# Patient Record
Sex: FEMALE | Race: WHITE | Employment: FULL TIME | URBAN - METROPOLITAN AREA
[De-identification: names, ages, dates, MRNs, and addresses within clinical notes are randomized per-mention and may not be internally consistent; named-entity substitution may affect disease eponyms.]

---

## 2019-11-22 LAB — PAP SMEAR, EXTERNAL: NEGATIVE

## 2020-12-03 VITALS
HEIGHT: 65 IN | SYSTOLIC BLOOD PRESSURE: 110 MMHG | DIASTOLIC BLOOD PRESSURE: 68 MMHG | BODY MASS INDEX: 23.16 KG/M2 | WEIGHT: 139 LBS

## 2020-12-03 PROBLEM — J45.909 ASTHMA: Status: ACTIVE | Noted: 2020-12-03

## 2020-12-03 PROBLEM — F32.A DEPRESSIVE DISORDER: Status: ACTIVE | Noted: 2020-12-03

## 2020-12-03 PROBLEM — F41.9 ANXIETY: Status: ACTIVE | Noted: 2020-12-03

## 2020-12-03 RX ORDER — SERTRALINE HYDROCHLORIDE 50 MG/1
TABLET, FILM COATED ORAL
COMMUNITY
Start: 2020-11-07

## 2020-12-03 RX ORDER — MELOXICAM 15 MG/1
TABLET ORAL
COMMUNITY
Start: 2020-10-08

## 2020-12-03 RX ORDER — MONTELUKAST SODIUM 10 MG/1
TABLET ORAL
COMMUNITY
Start: 2020-11-07

## 2020-12-08 VITALS — BODY MASS INDEX: 23.13 KG/M2 | HEIGHT: 65 IN

## 2020-12-09 ENCOUNTER — OFFICE VISIT (OUTPATIENT)
Dept: OBGYN CLINIC | Age: 50
End: 2020-12-09
Payer: COMMERCIAL

## 2020-12-09 VITALS
DIASTOLIC BLOOD PRESSURE: 82 MMHG | BODY MASS INDEX: 27.36 KG/M2 | WEIGHT: 164.25 LBS | SYSTOLIC BLOOD PRESSURE: 138 MMHG | HEIGHT: 65 IN

## 2020-12-09 DIAGNOSIS — N94.89 SUPPRESSION OF MENSTRUATION: ICD-10-CM

## 2020-12-09 DIAGNOSIS — Z01.419 ROUTINE GYNECOLOGICAL EXAMINATION: ICD-10-CM

## 2020-12-09 DIAGNOSIS — Z12.4 SCREENING FOR MALIGNANT NEOPLASM OF CERVIX: Primary | ICD-10-CM

## 2020-12-09 DIAGNOSIS — Z12.31 VISIT FOR SCREENING MAMMOGRAM: Primary | ICD-10-CM

## 2020-12-09 DIAGNOSIS — N94.10 DYSPAREUNIA IN FEMALE: ICD-10-CM

## 2020-12-09 PROCEDURE — 77067 SCR MAMMO BI INCL CAD: CPT | Performed by: OBSTETRICS & GYNECOLOGY

## 2020-12-09 PROCEDURE — 99396 PREV VISIT EST AGE 40-64: CPT | Performed by: OBSTETRICS & GYNECOLOGY

## 2020-12-09 RX ORDER — ESTRADIOL 10 UG/1
10 INSERT VAGINAL
Qty: 8 TAB | Refills: 11 | Status: SHIPPED | OUTPATIENT
Start: 2020-12-10 | End: 2022-01-12 | Stop reason: SDUPTHER

## 2020-12-09 RX ORDER — NORETHINDRONE ACETATE AND ETHINYL ESTRADIOL 1; .02 MG/1; MG/1
1 TABLET ORAL DAILY
Qty: 3 PACKAGE | Refills: 3 | Status: SHIPPED | OUTPATIENT
Start: 2020-12-09 | End: 2021-11-08 | Stop reason: SDUPTHER

## 2020-12-09 NOTE — PROGRESS NOTES
Eyad Pham is a , 52 y.o. female   Patient's last menstrual period was 2020. She presents for her annual    She is having no cycle for > 6 mths, having some dyspareunia. Menstrual status:  Her periods are: absent. Cycles are: very light to non existent due to contraceptive hormones. She does not have dysmenorrhea. Medical conditions:  Since her last annual GYN exam about one year ago, she has not the following changes in her health history: none. Past Medical History:   Diagnosis Date    Anxiety 12/3/2020    Asthma 12/3/2020    Depressive disorder 12/3/2020     Past Surgical History:   Procedure Laterality Date    HX  SECTION      HX CHOLECYSTECTOMY      HX TONSILLECTOMY      with removal of adenoids       Prior to Admission medications    Medication Sig Start Date End Date Taking? Authorizing Provider   Lex Salinas 619-19-31 mg-mg-million tab Take  by mouth. Yes Provider, Historical   meloxicam (MOBIC) 15 mg tablet take 1 tablet by mouth once daily 10/8/20  Yes Provider, Historical   montelukast (SINGULAIR) 10 mg tablet take 1 tablet by mouth once daily 20  Yes Provider, Historical   sertraline (ZOLOFT) 50 mg tablet take 1 tablet by mouth once daily 20  Yes Provider, Historical   Javier , , 1-20 mg-mcg tablet take 1 tablet by mouth once daily 11/3/20  Yes Marleen Valdes MD       No Known Allergies       Tobacco History:  reports that she has never smoked. She has never used smokeless tobacco.  Alcohol Abuse:  reports previous alcohol use. Drug Abuse:  reports no history of drug use. Family Medical/Cancer History:   Family History   Problem Relation Age of Onset    Diabetes Other     Heart Disease Other           Review of Systems   Constitutional: Negative for chills, fever, malaise/fatigue and weight loss. HENT: Negative for congestion, ear pain, sinus pain and tinnitus. Eyes: Negative for blurred vision and double vision. Respiratory: Negative for cough, shortness of breath and wheezing. Cardiovascular: Negative for chest pain and palpitations. Gastrointestinal: Negative for abdominal pain, blood in stool, constipation, diarrhea, heartburn, nausea and vomiting. Genitourinary: Negative for dysuria, flank pain, frequency, hematuria and urgency. Musculoskeletal: Negative for joint pain and myalgias. Skin: Negative for itching and rash. Neurological: Negative for dizziness, weakness and headaches. Psychiatric/Behavioral: Negative for depression, memory loss and suicidal ideas. The patient is not nervous/anxious and does not have insomnia. Physical Exam  Constitutional:       Appearance: Normal appearance. HENT:      Head: Normocephalic and atraumatic. Cardiovascular:      Rate and Rhythm: Normal rate. Heart sounds: Normal heart sounds. Pulmonary:      Effort: Pulmonary effort is normal.      Breath sounds: Normal breath sounds. Chest:      Breasts:         Right: Normal.         Left: Normal.   Abdominal:      General: Abdomen is flat. Palpations: Abdomen is soft. Genitourinary:     General: Normal vulva. Vagina: Normal.      Cervix: Normal.      Uterus: Normal.       Adnexa: Right adnexa normal and left adnexa normal.      Rectum: Normal.      Comments: PAP Obtained  Neurological:      Mental Status: She is alert. Psychiatric:         Mood and Affect: Mood normal.         Behavior: Behavior normal.         Thought Content: Thought content normal.          Visit Vitals  /82 (BP 1 Location: Right arm, BP Patient Position: Sitting)   Ht 5' 5\" (1.651 m)   Wt 164 lb 4 oz (74.5 kg)   LMP 01/01/2020 Comment: ncocp x 3mths then breaks   BMI 27.33 kg/m²         Assessment:  Diagnoses and all orders for this visit:    1. Screening for malignant neoplasm of cervix  -     PAP, LB, RFX [facility]([id number])    2. Routine gynecological examination  -     PAP, LB, RFX HPV [facility]([id number])    3. Suppression of menstruation    4. Dyspareunia in female    Other orders  -     estradioL (VAGIFEM) 10 mcg tab vaginal tablet; Insert 1 Tab into vagina every Monday and Thursday. -     norethindrone-ethinyl estradiol (Javier 1/20, 21,) 1-20 mg-mcg tablet; Take 1 Tab by mouth daily. Plan:Questions addressed  Counseled re: diet, exercise, healthy lifestyle  Return for Annual  Rec annual mammogram        Follow-up and Dispositions    · Return for 1 yr annual, 1 yr mammo.

## 2020-12-12 LAB
CYTOLOGIST CVX/VAG CYTO: NORMAL
CYTOLOGY CVX/VAG DOC CYTO: NORMAL
DX ICD CODE: NORMAL
LABCORP, 190119: NORMAL
Lab: NORMAL
Lab: NORMAL
OTHER STN SPEC: NORMAL
STAT OF ADQ CVX/VAG CYTO-IMP: NORMAL

## 2021-11-08 DIAGNOSIS — N94.89 SUPPRESSION OF MENSES: Primary | ICD-10-CM

## 2021-11-08 RX ORDER — NORETHINDRONE ACETATE AND ETHINYL ESTRADIOL 1; .02 MG/1; MG/1
1 TABLET ORAL DAILY
Qty: 3 DOSE PACK | Refills: 0 | Status: SHIPPED | OUTPATIENT
Start: 2021-11-08 | End: 2022-01-12 | Stop reason: SDUPTHER

## 2022-01-12 ENCOUNTER — OFFICE VISIT (OUTPATIENT)
Dept: OBGYN CLINIC | Age: 52
End: 2022-01-12
Payer: COMMERCIAL

## 2022-01-12 VITALS
WEIGHT: 166.5 LBS | SYSTOLIC BLOOD PRESSURE: 122 MMHG | DIASTOLIC BLOOD PRESSURE: 68 MMHG | HEIGHT: 65 IN | BODY MASS INDEX: 27.74 KG/M2

## 2022-01-12 DIAGNOSIS — Z01.419 ROUTINE GYNECOLOGICAL EXAMINATION: ICD-10-CM

## 2022-01-12 DIAGNOSIS — N94.89 SUPPRESSION OF MENSES: ICD-10-CM

## 2022-01-12 DIAGNOSIS — N94.89 SUPPRESSION OF MENSTRUATION: ICD-10-CM

## 2022-01-12 DIAGNOSIS — Z12.4 SCREENING FOR MALIGNANT NEOPLASM OF CERVIX: Primary | ICD-10-CM

## 2022-01-12 PROCEDURE — 99396 PREV VISIT EST AGE 40-64: CPT | Performed by: OBSTETRICS & GYNECOLOGY

## 2022-01-12 RX ORDER — ESTRADIOL 10 UG/1
10 INSERT VAGINAL
Qty: 8 TABLET | Refills: 11 | Status: SHIPPED | OUTPATIENT
Start: 2022-01-13

## 2022-01-12 RX ORDER — NORETHINDRONE ACETATE AND ETHINYL ESTRADIOL 1; .02 MG/1; MG/1
1 TABLET ORAL DAILY
Qty: 3 DOSE PACK | Refills: 3 | Status: SHIPPED | OUTPATIENT
Start: 2022-01-12

## 2022-01-12 NOTE — PROGRESS NOTES
Chief Complaint   Patient presents with    Annual Exam     Mammo done here today       Visit Vitals  /68 (BP 1 Location: Left upper arm, BP Patient Position: Sitting, BP Cuff Size: Small adult)   Ht 5' 5\" (1.651 m)   Wt 166 lb 8 oz (75.5 kg)   BMI 27.71 kg/m²

## 2022-01-12 NOTE — PROGRESS NOTES
Tim Carrizales is a , 54 y.o. female   No LMP recorded. (Menstrual status: Other (see Comment)). She presents for her annual    She is having no significant problems. Menstrual status:  Cycles are very light to nonexistent due to contraceptive hormones. Flow: absent. She does not have dysmenorrhea. Medical conditions:  Since her last annual GYN exam about one year ago, she has not the following changes in her health history: none. Mammogram History:    [facility] Results (most recent):  Results from Office Visit encounter on 20    [facility] MAMMO BI SCREENING INCL CAD       DEXA Results (most recent):  No results found for this or any previous visit. Past Medical History:   Diagnosis Date    Anxiety 12/3/2020    Asthma 12/3/2020    Depressive disorder 12/3/2020     Past Surgical History:   Procedure Laterality Date    HX  SECTION      HX CHOLECYSTECTOMY      HX GI  ~9 yrs ago    Colonoscopy    HX TONSILLECTOMY      with removal of adenoids       Prior to Admission medications    Medication Sig Start Date End Date Taking? Authorizing Provider   Ajay Angel 109-46-99 mg-mg-million tab Take  by mouth. Yes Provider, Historical   meloxicam (MOBIC) 15 mg tablet take 1 tablet by mouth once daily 10/8/20  Yes Provider, Historical   montelukast (SINGULAIR) 10 mg tablet take 1 tablet by mouth once daily 20  Yes Provider, Historical   sertraline (ZOLOFT) 50 mg tablet take 1 tablet by mouth once daily 20  Yes Provider, Historical   estradioL (VAGIFEM) 10 mcg tab vaginal tablet Insert 1 Tablet into vagina every Monday and Thursday. 22  Yes Army Jarrett MD   norethindrone-ethinyl estradiol Ned Lemus , ,) 1-20 mg-mcg tablet Take 1 Tablet by mouth daily. 22  Yes Army Jarrett MD       No Known Allergies       Tobacco History:  reports that she has never smoked.  She has never used smokeless tobacco.  Alcohol Abuse:  reports previous alcohol use.  Drug Abuse:  reports no history of drug use. Family Medical/Cancer History:   Family History   Problem Relation Age of Onset    Diabetes Other     Heart Disease Other           Review of Systems   Constitutional: Negative for chills, fever, malaise/fatigue and weight loss. HENT: Negative for congestion, ear pain, sinus pain and tinnitus. Eyes: Negative for blurred vision and double vision. Respiratory: Negative for cough, shortness of breath and wheezing. Cardiovascular: Negative for chest pain and palpitations. Gastrointestinal: Negative for abdominal pain, blood in stool, constipation, diarrhea, heartburn, nausea and vomiting. Genitourinary: Negative for dysuria, flank pain, frequency, hematuria and urgency. Musculoskeletal: Negative for joint pain and myalgias. Skin: Negative for itching and rash. Neurological: Negative for dizziness, weakness and headaches. Psychiatric/Behavioral: Negative for depression, memory loss and suicidal ideas. The patient is not nervous/anxious and does not have insomnia. Physical Exam  Constitutional:       Appearance: Normal appearance. HENT:      Head: Normocephalic and atraumatic. Cardiovascular:      Rate and Rhythm: Normal rate. Heart sounds: Normal heart sounds. Pulmonary:      Effort: Pulmonary effort is normal.      Breath sounds: Normal breath sounds. Chest:   Breasts:      Right: Normal.      Left: Normal.       Abdominal:      General: Abdomen is flat. Palpations: Abdomen is soft. Genitourinary:     General: Normal vulva. Vagina: Normal.      Cervix: Normal.      Uterus: Normal.       Adnexa: Right adnexa normal and left adnexa normal.      Rectum: Normal.      Comments: PAP Obtained  Neurological:      Mental Status: She is alert. Psychiatric:         Mood and Affect: Mood normal.         Behavior: Behavior normal.         Thought Content:  Thought content normal.          Visit Vitals  /68 (BP 1 Location: Left upper arm, BP Patient Position: Sitting, BP Cuff Size: Small adult)   Ht 5' 5\" (1.651 m)   Wt 166 lb 8 oz (75.5 kg)   BMI 27.71 kg/m²         Assessment:   Diagnoses and all orders for this visit:    1. Screening for malignant neoplasm of cervix  -     PAP IG, RFX APTIMA HPV [facility] ([id number])    2. Routine gynecological examination  -     PAP IG, RFX APTIMA HPV [facility] ([id number])    3. Suppression of menstruation    4. Suppression of menses  -     norethindrone-ethinyl estradiol (Javier 1/20, 21,) 1-20 mg-mcg tablet; Take 1 Tablet by mouth daily. Other orders  -     estradioL (VAGIFEM) 10 mcg tab vaginal tablet; Insert 1 Tablet into vagina every Monday and Thursday. Plan: Questions addressed  Counseled re: diet, exercise, healthy lifestyle  Return for Annual  Rec annual mammogram      Follow-up and Dispositions    · Return for 1 yr annual, 1 yr mammo.

## 2022-01-13 LAB
CYTOLOGIST CVX/VAG CYTO: NORMAL
CYTOLOGY CVX/VAG DOC CYTO: NORMAL
CYTOLOGY CVX/VAG DOC THIN PREP: NORMAL
DX ICD CODE: NORMAL
LABCORP, 190119: NORMAL
Lab: NORMAL
OTHER STN SPEC: NORMAL
STAT OF ADQ CVX/VAG CYTO-IMP: NORMAL

## 2022-03-19 PROBLEM — F41.9 ANXIETY: Status: ACTIVE | Noted: 2020-12-03

## 2022-03-20 PROBLEM — F32.A DEPRESSIVE DISORDER: Status: ACTIVE | Noted: 2020-12-03

## 2022-03-20 PROBLEM — J45.909 ASTHMA: Status: ACTIVE | Noted: 2020-12-03

## 2023-01-12 DIAGNOSIS — N94.89 SUPPRESSION OF MENSES: ICD-10-CM

## 2023-01-12 RX ORDER — NORETHINDRONE ACETATE/ETHINYL ESTRADIOL 1MG-20MCG
KIT ORAL
Qty: 21 TABLET | Refills: 0 | Status: SHIPPED | OUTPATIENT
Start: 2023-01-12

## 2023-01-25 ENCOUNTER — OFFICE VISIT (OUTPATIENT)
Dept: OBGYN CLINIC | Age: 53
End: 2023-01-25
Payer: COMMERCIAL

## 2023-01-25 VITALS
BODY MASS INDEX: 26.39 KG/M2 | WEIGHT: 158.38 LBS | SYSTOLIC BLOOD PRESSURE: 152 MMHG | HEIGHT: 65 IN | DIASTOLIC BLOOD PRESSURE: 76 MMHG

## 2023-01-25 DIAGNOSIS — N94.89 SUPPRESSION OF MENSES: ICD-10-CM

## 2023-01-25 DIAGNOSIS — Z01.419 ROUTINE GYNECOLOGICAL EXAMINATION: ICD-10-CM

## 2023-01-25 DIAGNOSIS — N94.89 SUPPRESSION OF MENSTRUATION: ICD-10-CM

## 2023-01-25 DIAGNOSIS — Z12.4 SCREENING FOR MALIGNANT NEOPLASM OF CERVIX: Primary | ICD-10-CM

## 2023-01-25 DIAGNOSIS — N95.1 MENOPAUSAL SYNDROME: ICD-10-CM

## 2023-01-25 PROCEDURE — 99396 PREV VISIT EST AGE 40-64: CPT | Performed by: OBSTETRICS & GYNECOLOGY

## 2023-01-25 RX ORDER — NORETHINDRONE ACETATE AND ETHINYL ESTRADIOL 1; .02 MG/1; MG/1
1 TABLET ORAL DAILY
Qty: 63 TABLET | Refills: 4 | Status: SHIPPED | OUTPATIENT
Start: 2023-01-25

## 2023-01-25 RX ORDER — ESTRADIOL 10 UG/1
10 INSERT VAGINAL
Qty: 8 TABLET | Refills: 11 | Status: SHIPPED | OUTPATIENT
Start: 2023-01-26

## 2023-01-25 NOTE — PROGRESS NOTES
Arminda Valdivia is a , 50 y.o. female   No LMP recorded. (Menstrual status: Other (see Comment)). She presents for her annual    She is having no significant problems. Menstrual status:  Cycles are very light to nonexistent due to contraceptive hormones. Flow: absent. She does not have dysmenorrhea. Medical conditions:  Since her last annual GYN exam about one year ago, she has not the following changes in her health history: none. Mammogram History:    [facility] Results (most recent):  Results from Appointment encounter on 23    [facility] MAMMO BI SCREENING INCL CAD    Narrative  STUDY: Bilateral digital screening mammogram    INDICATION:  Screening. COMPARISON: ,     BREAST COMPOSITION:  There are scattered areas of fibroglandular density. FINDINGS: Bilateral digital screening mammography was performed and is  interpreted in conjunction with a computer assisted detection (CAD) system. No  suspicious masses or calcifications are identified. There has been no  significant change. Impression  BI-RADS 1: Negative. No mammographic evidence of malignancy. RECOMMENDATIONS:  Next screening mammogram is recommended in one year. The patient will be notified of these results. DEXA Results (most recent):  No results found for this or any previous visit. Past Medical History:   Diagnosis Date    Anxiety 12/3/2020    Asthma 12/3/2020    Depressive disorder 12/3/2020     Past Surgical History:   Procedure Laterality Date    HX  SECTION      HX CHOLECYSTECTOMY      HX GI  ~9 yrs ago    Colonoscopy    HX TONSILLECTOMY      with removal of adenoids       Prior to Admission medications    Medication Sig Start Date End Date Taking? Authorizing Provider   estradioL (VAGIFEM) 10 mcg tab vaginal tablet Insert 1 Tablet into vagina every Monday and Thursday.  23  Yes Tobi Nielson MD   norethindrone-ethinyl estradiol Vandanals Corn , ,) 1-20 mg-mcg tablet Take 1 Tablet by mouth daily. 1/25/23  Yes Jodi SouzaMichael dennison 97 [id number] mg-mg-million tab Take  by mouth. Yes Provider, Historical   meloxicam (MOBIC) 15 mg tablet take 1 tablet by mouth once daily 10/8/20  Yes Provider, Historical   montelukast (SINGULAIR) 10 mg tablet take 1 tablet by mouth once daily 11/7/20  Yes Provider, Historical   sertraline (ZOLOFT) 50 mg tablet take 1 tablet by mouth once daily 11/7/20  Yes Provider, Historical       No Known Allergies       Tobacco History:  reports that she has never smoked. She has never used smokeless tobacco.  Alcohol use:  reports that she does not currently use alcohol. Drug use:  reports no history of drug use. Family Medical/Cancer History:   Family History   Problem Relation Age of Onset    Diabetes Other     Heart Disease Other           Review of Systems   Constitutional:  Negative for chills, fever, malaise/fatigue and weight loss. HENT:  Negative for congestion, ear pain, sinus pain and tinnitus. Eyes:  Negative for blurred vision and double vision. Respiratory:  Negative for cough, shortness of breath and wheezing. Cardiovascular:  Negative for chest pain and palpitations. Gastrointestinal:  Negative for abdominal pain, blood in stool, constipation, diarrhea, heartburn, nausea and vomiting. Genitourinary:  Negative for dysuria, flank pain, frequency, hematuria and urgency. Musculoskeletal:  Negative for joint pain and myalgias. Skin:  Negative for itching and rash. Neurological:  Negative for dizziness, weakness and headaches. Psychiatric/Behavioral:  Negative for depression, memory loss and suicidal ideas. The patient is not nervous/anxious and does not have insomnia. Physical Exam  Constitutional:       Appearance: Normal appearance. HENT:      Head: Normocephalic and atraumatic. Cardiovascular:      Rate and Rhythm: Normal rate. Heart sounds: Normal heart sounds.    Pulmonary:      Effort: Pulmonary effort is normal.      Breath sounds: Normal breath sounds. Chest:   Breasts:     Right: Normal.      Left: Normal.   Abdominal:      General: Abdomen is flat. Palpations: Abdomen is soft. Genitourinary:     General: Normal vulva. Vagina: Normal.      Cervix: Normal.      Uterus: Normal.       Adnexa: Right adnexa normal and left adnexa normal.      Rectum: Normal.      Comments: PAP Obtained  Neurological:      Mental Status: She is alert. Psychiatric:         Mood and Affect: Mood normal.         Behavior: Behavior normal.         Thought Content: Thought content normal.        Visit Vitals  BP (!) 152/76 (BP 1 Location: Right arm, BP Patient Position: Sitting, BP Cuff Size: Small adult)   Ht 5' 5\" (1.651 m)   Wt 158 lb 6 oz (71.8 kg)   BMI 26.35 kg/m²         Assessment: Diagnoses and all orders for this visit:    1. Screening for malignant neoplasm of cervix  -     PAP IG, RFX APTIMA HPV [facility] ([id number])    2. Routine gynecological examination  -     PAP IG, RFX APTIMA HPV [facility] ([id number])    3. Menopausal syndrome    4. Suppression of menstruation    5. Suppression of menses  -     norethindrone-ethinyl estradiol (Javier 1/20, 21,) 1-20 mg-mcg tablet; Take 1 Tablet by mouth daily. Other orders  -     estradioL (VAGIFEM) 10 mcg tab vaginal tablet; Insert 1 Tablet into vagina every Monday and Thursday. Plan: Questions addressed  Counseled re: diet, exercise, healthy lifestyle  Return for Annual  Rec annual mammogram        Follow-up and Dispositions    Return for 1 yr annual, 1 yr mammo.

## 2023-01-25 NOTE — PROGRESS NOTES
Chief Complaint   Patient presents with    Annual Exam     Mammo done today     Visit Vitals  BP (!) 152/76 (BP 1 Location: Right arm, BP Patient Position: Sitting, BP Cuff Size: Small adult)   Ht 5' 5\" (1.651 m)   Wt 158 lb 6 oz (71.8 kg)   BMI 26.35 kg/m²

## 2023-05-25 RX ORDER — NORETHINDRONE ACETATE AND ETHINYL ESTRADIOL 1; .02 MG/1; MG/1
1 TABLET ORAL DAILY
COMMUNITY
Start: 2023-01-25

## 2023-05-25 RX ORDER — MELOXICAM 15 MG/1
1 TABLET ORAL DAILY
COMMUNITY
Start: 2020-10-08

## 2023-05-25 RX ORDER — MONTELUKAST SODIUM 10 MG/1
1 TABLET ORAL DAILY
COMMUNITY
Start: 2020-11-07

## 2023-05-25 RX ORDER — ESTRADIOL 10 UG/1
10 INSERT VAGINAL
COMMUNITY
Start: 2023-01-26

## 2024-01-05 ENCOUNTER — OFFICE VISIT (OUTPATIENT)
Age: 54
End: 2024-01-05

## 2024-01-05 VITALS
BODY MASS INDEX: 29.32 KG/M2 | SYSTOLIC BLOOD PRESSURE: 122 MMHG | DIASTOLIC BLOOD PRESSURE: 72 MMHG | HEIGHT: 65 IN | WEIGHT: 176 LBS

## 2024-01-05 DIAGNOSIS — N95.1 MENOPAUSAL SYNDROME: ICD-10-CM

## 2024-01-05 DIAGNOSIS — Z12.4 PAP SMEAR FOR CERVICAL CANCER SCREENING: Primary | ICD-10-CM

## 2024-01-05 DIAGNOSIS — Z01.419 GYNECOLOGIC EXAM NORMAL: ICD-10-CM

## 2024-01-05 RX ORDER — NORETHINDRONE ACETATE AND ETHINYL ESTRADIOL .02; 1 MG/1; MG/1
1 TABLET ORAL DAILY
Qty: 3 PACKET | Refills: 3 | Status: SHIPPED | OUTPATIENT
Start: 2024-01-05

## 2024-01-05 ASSESSMENT — ENCOUNTER SYMPTOMS
RESPIRATORY NEGATIVE: 1
GASTROINTESTINAL NEGATIVE: 1

## 2024-01-05 NOTE — PROGRESS NOTES
Chief Complaint   Patient presents with    Annual Exam     Mammo rescheduled until February. To early to have today     /72 (Site: Left Upper Arm, Position: Sitting, Cuff Size: Large Adult)   Ht 1.651 m (5' 5\")   Wt 79.8 kg (176 lb)   BMI 29.29 kg/m²     
  Neurological: Negative.    Psychiatric/Behavioral: Negative.           /72 (Site: Left Upper Arm, Position: Sitting, Cuff Size: Large Adult)   Ht 1.651 m (5' 5\")   Wt 79.8 kg (176 lb)   BMI 29.29 kg/m²     Physical Exam  Constitutional:       Appearance: Normal appearance.   Cardiovascular:      Rate and Rhythm: Normal rate and regular rhythm.   Pulmonary:      Effort: Pulmonary effort is normal.      Breath sounds: Normal breath sounds.   Chest:   Breasts:     Right: Normal.      Left: Normal.   Abdominal:      General: Abdomen is flat.      Palpations: Abdomen is soft.   Genitourinary:     General: Normal vulva.      Vagina: Normal.      Cervix: Normal.      Uterus: Normal.       Adnexa: Right adnexa normal and left adnexa normal.      Rectum: Normal.      Comments: Pap obtained  Musculoskeletal:         General: Normal range of motion.   Neurological:      General: No focal deficit present.      Mental Status: She is alert and oriented to person, place, and time. Mental status is at baseline.   Psychiatric:         Mood and Affect: Mood normal.         Behavior: Behavior normal.         Thought Content: Thought content normal.         Judgment: Judgment normal.              Assessment: Cristy was seen today for annual exam.    Diagnoses and all orders for this visit:    Pap smear for cervical cancer screening  -     PAP LB, Reflex HPV ASCUS ([id number]) ([facility])    Gynecologic exam normal    Menopausal syndrome  -     norethindrone-ethinyl estradiol (MICROGESTIN 1/20) 1-20 MG-MCG per tablet; Take 1 tablet by mouth daily        Plan: Questions addressed  Counseled re: diet, exercise, healthy lifestyle  Return for Annual  Rec annual mammogram    Rec: Colonoscopy

## 2024-01-08 LAB
., LABCORP: NORMAL
CYTOLOGIST CVX/VAG CYTO: NORMAL
CYTOLOGY CVX/VAG DOC CYTO: NORMAL
CYTOLOGY CVX/VAG DOC THIN PREP: NORMAL
DX ICD CODE: NORMAL
Lab: NORMAL
OTHER STN SPEC: NORMAL
STAT OF ADQ CVX/VAG CYTO-IMP: NORMAL

## 2025-01-09 ENCOUNTER — TELEPHONE (OUTPATIENT)
Age: 55
End: 2025-01-09

## 2025-01-10 DIAGNOSIS — N95.1 MENOPAUSAL SYNDROME: ICD-10-CM

## 2025-01-10 RX ORDER — NORETHINDRONE ACETATE AND ETHINYL ESTRADIOL .02; 1 MG/1; MG/1
1 TABLET ORAL DAILY
Qty: 4 PACKET | Refills: 0 | Status: SHIPPED | OUTPATIENT
Start: 2025-01-10

## 2025-02-10 ENCOUNTER — OFFICE VISIT (OUTPATIENT)
Age: 55
End: 2025-02-10
Payer: COMMERCIAL

## 2025-02-10 DIAGNOSIS — Z12.4 PAP SMEAR FOR CERVICAL CANCER SCREENING: ICD-10-CM

## 2025-02-10 DIAGNOSIS — Z12.31 SCREENING MAMMOGRAM FOR BREAST CANCER: Primary | ICD-10-CM

## 2025-02-10 DIAGNOSIS — N95.1 MENOPAUSAL SYNDROME: ICD-10-CM

## 2025-02-10 PROCEDURE — 99396 PREV VISIT EST AGE 40-64: CPT | Performed by: OBSTETRICS & GYNECOLOGY

## 2025-02-10 RX ORDER — NORETHINDRONE ACETATE AND ETHINYL ESTRADIOL .02; 1 MG/1; MG/1
1 TABLET ORAL DAILY
Qty: 4 PACKET | Refills: 3 | Status: SHIPPED | OUTPATIENT
Start: 2025-02-10

## 2025-02-10 RX ORDER — PHENTERMINE HYDROCHLORIDE 37.5 MG/1
37.5 TABLET ORAL
Qty: 90 TABLET | Refills: 0 | Status: SHIPPED | OUTPATIENT
Start: 2025-02-10 | End: 2025-08-09

## 2025-02-10 ASSESSMENT — ENCOUNTER SYMPTOMS
RESPIRATORY NEGATIVE: 1
GASTROINTESTINAL NEGATIVE: 1

## 2025-02-14 LAB
., LABCORP: NORMAL
CYTOLOGIST CVX/VAG CYTO: NORMAL
CYTOLOGY CVX/VAG DOC CYTO: NORMAL
CYTOLOGY CVX/VAG DOC THIN PREP: NORMAL
DX ICD CODE: NORMAL
OTHER STN SPEC: NORMAL
SERVICE CMNT-IMP: NORMAL
STAT OF ADQ CVX/VAG CYTO-IMP: NORMAL